# Patient Record
Sex: MALE | Race: WHITE | NOT HISPANIC OR LATINO | Employment: OTHER | URBAN - METROPOLITAN AREA
[De-identification: names, ages, dates, MRNs, and addresses within clinical notes are randomized per-mention and may not be internally consistent; named-entity substitution may affect disease eponyms.]

---

## 2023-01-31 DIAGNOSIS — E11.9 TYPE 2 DIABETES MELLITUS (H): ICD-10-CM

## 2023-01-31 DIAGNOSIS — Z76.0 ENCOUNTER FOR MEDICATION REFILL: Primary | ICD-10-CM

## 2023-01-31 RX ORDER — DULAGLUTIDE 0.75 MG/.5ML
0.75 INJECTION, SOLUTION SUBCUTANEOUS
Qty: 6 ML | Refills: 3 | OUTPATIENT
Start: 2023-01-31

## 2023-01-31 NOTE — TELEPHONE ENCOUNTER
There is no records currently available in the chart to indicate that patient has seen this provider.  Patient needs to establish cares.

## 2024-09-19 ENCOUNTER — APPOINTMENT (OUTPATIENT)
Dept: RADIOLOGY | Facility: HOSPITAL | Age: 69
End: 2024-09-19
Attending: EMERGENCY MEDICINE
Payer: MEDICARE

## 2024-09-19 ENCOUNTER — HOSPITAL ENCOUNTER (EMERGENCY)
Facility: HOSPITAL | Age: 69
Discharge: HOME | End: 2024-09-19
Attending: EMERGENCY MEDICINE | Admitting: EMERGENCY MEDICINE
Payer: MEDICARE

## 2024-09-19 VITALS
WEIGHT: 260 LBS | TEMPERATURE: 98.7 F | OXYGEN SATURATION: 97 % | RESPIRATION RATE: 18 BRPM | DIASTOLIC BLOOD PRESSURE: 91 MMHG | HEART RATE: 90 BPM | SYSTOLIC BLOOD PRESSURE: 139 MMHG

## 2024-09-19 DIAGNOSIS — R07.9 CHEST PAIN, UNSPECIFIED TYPE: ICD-10-CM

## 2024-09-19 LAB
ANION GAP SERPL CALCULATED.3IONS-SCNC: 11 MMOL/L (ref 7–15)
BASOPHILS # BLD AUTO: 0.1 10E3/UL (ref 0–0.2)
BASOPHILS NFR BLD AUTO: 1 %
BUN SERPL-MCNC: 10.1 MG/DL (ref 8–23)
CALCIUM SERPL-MCNC: 9.1 MG/DL (ref 8.8–10.4)
CHLORIDE SERPL-SCNC: 104 MMOL/L (ref 98–107)
CREAT SERPL-MCNC: 0.71 MG/DL (ref 0.67–1.17)
EGFRCR SERPLBLD CKD-EPI 2021: >90 ML/MIN/1.73M2
EOSINOPHIL # BLD AUTO: 0.3 10E3/UL (ref 0–0.7)
EOSINOPHIL NFR BLD AUTO: 3 %
ERYTHROCYTE [DISTWIDTH] IN BLOOD BY AUTOMATED COUNT: 12.6 % (ref 10–15)
GLUCOSE SERPL-MCNC: 204 MG/DL (ref 70–99)
HCO3 SERPL-SCNC: 23 MMOL/L (ref 22–29)
HCT VFR BLD AUTO: 44.5 % (ref 40–53)
HGB BLD-MCNC: 15.1 G/DL (ref 13.3–17.7)
HOLD SPECIMEN: NORMAL
IMM GRANULOCYTES # BLD: 0.1 10E3/UL
IMM GRANULOCYTES NFR BLD: 1 %
LYMPHOCYTES # BLD AUTO: 2.8 10E3/UL (ref 0.8–5.3)
LYMPHOCYTES NFR BLD AUTO: 30 %
MCH RBC QN AUTO: 29.5 PG (ref 26.5–33)
MCHC RBC AUTO-ENTMCNC: 33.9 G/DL (ref 31.5–36.5)
MCV RBC AUTO: 87 FL (ref 78–100)
MONOCYTES # BLD AUTO: 0.7 10E3/UL (ref 0–1.3)
MONOCYTES NFR BLD AUTO: 7 %
NEUTROPHILS # BLD AUTO: 5.3 10E3/UL (ref 1.6–8.3)
NEUTROPHILS NFR BLD AUTO: 58 %
NRBC # BLD AUTO: 0 10E3/UL
NRBC BLD AUTO-RTO: 0 /100
NT-PROBNP SERPL-MCNC: <36 PG/ML (ref 0–900)
PLATELET # BLD AUTO: 229 10E3/UL (ref 150–450)
POTASSIUM SERPL-SCNC: 4.2 MMOL/L (ref 3.4–5.3)
RBC # BLD AUTO: 5.12 10E6/UL (ref 4.4–5.9)
SODIUM SERPL-SCNC: 138 MMOL/L (ref 135–145)
TROPONIN T SERPL HS-MCNC: 13 NG/L
TROPONIN T SERPL HS-MCNC: 16 NG/L
WBC # BLD AUTO: 9.2 10E3/UL (ref 4–11)

## 2024-09-19 PROCEDURE — 85025 COMPLETE CBC W/AUTO DIFF WBC: CPT | Performed by: STUDENT IN AN ORGANIZED HEALTH CARE EDUCATION/TRAINING PROGRAM

## 2024-09-19 PROCEDURE — 83880 ASSAY OF NATRIURETIC PEPTIDE: CPT | Mod: GZ | Performed by: STUDENT IN AN ORGANIZED HEALTH CARE EDUCATION/TRAINING PROGRAM

## 2024-09-19 PROCEDURE — 80048 BASIC METABOLIC PNL TOTAL CA: CPT | Performed by: STUDENT IN AN ORGANIZED HEALTH CARE EDUCATION/TRAINING PROGRAM

## 2024-09-19 PROCEDURE — 36415 COLL VENOUS BLD VENIPUNCTURE: CPT | Performed by: STUDENT IN AN ORGANIZED HEALTH CARE EDUCATION/TRAINING PROGRAM

## 2024-09-19 PROCEDURE — 99285 EMERGENCY DEPT VISIT HI MDM: CPT | Mod: 25

## 2024-09-19 PROCEDURE — 93005 ELECTROCARDIOGRAM TRACING: CPT | Performed by: EMERGENCY MEDICINE

## 2024-09-19 PROCEDURE — 84484 ASSAY OF TROPONIN QUANT: CPT | Performed by: STUDENT IN AN ORGANIZED HEALTH CARE EDUCATION/TRAINING PROGRAM

## 2024-09-19 PROCEDURE — 71046 X-RAY EXAM CHEST 2 VIEWS: CPT

## 2024-09-19 ASSESSMENT — COLUMBIA-SUICIDE SEVERITY RATING SCALE - C-SSRS
6. HAVE YOU EVER DONE ANYTHING, STARTED TO DO ANYTHING, OR PREPARED TO DO ANYTHING TO END YOUR LIFE?: NO
1. IN THE PAST MONTH, HAVE YOU WISHED YOU WERE DEAD OR WISHED YOU COULD GO TO SLEEP AND NOT WAKE UP?: NO
2. HAVE YOU ACTUALLY HAD ANY THOUGHTS OF KILLING YOURSELF IN THE PAST MONTH?: NO

## 2024-09-19 NOTE — ED PROVIDER NOTES
EMERGENCY DEPARTMENT ENCOUNTER     NAME: Brad Acuna   AGE: 68 year old male   YOB: 1955   MRN: [id number]   EVALUATION DATE & TIME: No admission date for patient encounter.   PCP: No primary care provider on file.     Chief Complaint   Patient presents with    Chest Pain   :    FINAL IMPRESSION       1. Chest pain, unspecified type           ED COURSE & MEDICAL DECISION MAKING      Pertinent Labs & Imaging studies reviewed. (See chart for details)   68 year old male  presents to the Emergency Department for evaluation of intermittent chest discomfort over the last 3 weeks. Initial Vitals Reviewed. Initial exam notable for mildly well-appearing male who is asymptomatic at this time.  He was initially hypertensive to around 170 but improved to the 130s with no intervention after relaxing.  I did discuss that we would look to rule out acute ACS in the ED, but it is also possible that he does need repeat stress test or further cardiac testing as he could have coronary artery disease causing these symptoms.  I think it is very atypical for something like PE or dissection as symptoms have been intermittent over several weeks.  Fortunately, EKG is nonischemic and troponin on arrival and 2 hours later remains negative.  Patient feels comfortable with the discharge and follow-up plan and I placed a referral to the rapid access cardiology clinic and will discharge with return precautions.           At the conclusion of the encounter I discussed the results of all of the tests and the disposition. The questions were answered. The patient or family acknowledged understanding and was agreeable with the care plan.     0 minutes critical care time, see procedure note below for details if relevant    Medical Decision Making    History:  Supplemental history from: Documented in chart  External Record(s) reviewed: Outpatient Record: Phone call with internal medicine today    Work Up:  Chart documentation includes  differential considered and any EKGs or imaging independently interpreted by provider, where specified.  In additional to work up documented, I considered the following work up: Documented in chart, if applicable.    External consultation:  Discussion of management with another provider: Documented in chart, if applicable    Complicating factors:  Care impacted by chronic illness: Other: obesity, mental health  Care affected by social determinants of health: Access to Medical Care    Disposition considerations: Discharge. No recommendations on prescription strength medication(s). I considered admission, but ultimately discharged patient with reassuring workup and outpatient follow-up plan.    Not Applicable            MEDICATIONS GIVEN IN THE EMERGENCY:   Medications - No data to display   NEW PRESCRIPTIONS STARTED AT TODAY'S ER VISIT   New Prescriptions    No medications on file     ================================================================   HISTORY OF PRESENT ILLNESS       Patient information was obtained from: patient   Use of Intrepreter:   Brad Acuna is a 68 year old male with history of bipolar disorder who presents for evaluation of episodic chest discomfort.  Patient states this started initially about 3 weeks ago and has been coming intermittently since.  He describes it as a tightness in the front of his chest that when it is present is approximately a 1-3 in severity out of 10.  He has not previously sought care for this.  He does note that he had a stress test 1 time in the past but thinks it was about 20 years ago.  No shortness of breath.    ================================================================        PAST HISTORY     PAST MEDICAL HISTORY:   No past medical history on file.   PAST SURGICAL HISTORY:   No past surgical history on file.   CURRENT MEDICATIONS:   divalproex (DEPAKOTE) 500 MG 24 hr tablet  lithium (LITHOBID) 300 MG CR tablet  lurasidone (LATUDA) 80 MG TABS  tablet      ALLERGIES:   No Known Allergies   FAMILY HISTORY:   No family history on file.   SOCIAL HISTORY:   Social History     Socioeconomic History    Marital status: Single     Social Determinants of Health     Financial Resource Strain: Low Risk  (6/19/2024)    Received from [employer] [facility]    Financial Resource Strain     Difficulty of Paying Living Expenses: 3   Food Insecurity: No Food Insecurity (6/19/2024)    Received from [employer] [facility]    Food Insecurity     Worried About Running Out of Food in the Last Year: 1   Transportation Needs: No Transportation Needs (6/19/2024)    Received from [employer] [facility]    Transportation Needs     Lack of Transportation (Medical): 1   Social Connections: Socially Integrated (6/19/2024)    Received from [employer] [facility]    Social Connections     Frequency of Communication with Friends and Family: 0   Housing Stability: Low Risk  (6/19/2024)    Received from [employer] [facility]    Housing Stability     Unable to Pay for Housing in the Last Year: 1        VITALS  Patient Vitals for the past 24 hrs:   BP Temp Temp src Pulse Resp SpO2 Weight   09/19/24 1622 (!) 152/77 97.9  F (36.6  C) Oral 92 18 95 % 117.9 kg (260 lb)        ================================================================    PHYSICAL EXAM     VITAL SIGNS: BP (!) 152/77   Pulse 92   Temp 97.9  F (36.6  C) (Oral)   Resp 18   Wt 117.9 kg (260 lb)   SpO2 95%    Constitutional:  Awake, no acute distress   HENT:  Atraumatic, oropharynx without exudate or erythema, membranes moist  Lymph:  No adenopathy  Eyes: EOM intact, PERRL, no injection  Neck: Supple  Respiratory:  Clear to auscultation bilaterally, no wheezes or crackles   Cardiovascular:  Regular rate and rhythm, single S1 and S2   GI:  Soft, nontender, nondistended, no rebound or guarding   Musculoskeletal:  Moves  all extremities, no lower extremity edema, no deformities    Skin:  Warm, dry  Neurologic:  Alert and oriented x3, no focal deficits noted       ================================================================  LAB       All pertinent labs reviewed and interpreted.   Labs Ordered and Resulted from Time of ED Arrival to Time of ED Departure   BASIC METABOLIC PANEL - Abnormal       Result Value    Sodium 138      Potassium 4.2      Chloride 104      Carbon Dioxide (CO2) 23      Anion Gap 11      Urea Nitrogen 10.1      Creatinine 0.71      GFR Estimate >90      Calcium 9.1      Glucose 204 (*)    NT PROBNP INPATIENT - Normal    N terminal Pro BNP Inpatient <36     TROPONIN T, HIGH SENSITIVITY - Normal    Troponin T, High Sensitivity 16     TROPONIN T, HIGH SENSITIVITY - Normal    Troponin T, High Sensitivity 13     CBC WITH PLATELETS AND DIFFERENTIAL    WBC Count 9.2      RBC Count 5.12      Hemoglobin 15.1      Hematocrit 44.5      MCV 87      MCH 29.5      MCHC 33.9      RDW 12.6      Platelet Count 229      % Neutrophils 58      % Lymphocytes 30      % Monocytes 7      % Eosinophils 3      % Basophils 1      % Immature Granulocytes 1      NRBCs per 100 WBC 0      Absolute Neutrophils 5.3      Absolute Lymphocytes 2.8      Absolute Monocytes 0.7      Absolute Eosinophils 0.3      Absolute Basophils 0.1      Absolute Immature Granulocytes 0.1      Absolute NRBCs 0.0          ===============================================================  RADIOLOGY       Reviewed all pertinent imaging. Please see official radiology report.   XR Chest 2 Views   Final Result   IMPRESSION: Minimal left basilar scarring. The lungs are otherwise clear. No pleural effusion. Normal heart size.            ================================================================  EKG     EKG reviewed interpreted by me shows sinus rhythm with a rate of 93, left axis, QTc 425 with no acute ST or T wave changes no previous to compare    I have  independently reviewed and interpreted the EKG(s) documented above.     ================================================================  PROCEDURES           Purnima Virgen M.D.   Emergency Medicine   [facility] EMERGENCY DEPARTMENT  [address] [place] [id number]  [phone]  Dept: [phone]        Purnima Virgen MD  09/19/24 2015

## 2024-09-19 NOTE — ED TRIAGE NOTES
The pt presents for evaluation of intermittent chest pain across his chest. Pain has been ongoing for about months. EKG done before triage.

## 2024-09-20 NOTE — DISCHARGE INSTRUCTIONS
Fortunately, the tests in the ER are negative for an acute heart attack.  Like we discussed, the neck step is a referral has been placed to the rapid access cardiology clinic.  They should be calling you tomorrow to get this scheduled and have additional testing of your heart.

## 2024-09-30 LAB
ABO/RH(D): NORMAL
ANTIBODY SCREEN: NEGATIVE
ATRIAL RATE - MUSE: 93 BPM
DIASTOLIC BLOOD PRESSURE - MUSE: NORMAL MMHG
INTERPRETATION ECG - MUSE: NORMAL
P AXIS - MUSE: 49 DEGREES
PR INTERVAL - MUSE: 206 MS
QRS DURATION - MUSE: 100 MS
QT - MUSE: 342 MS
QTC - MUSE: 425 MS
R AXIS - MUSE: -37 DEGREES
SPECIMEN EXPIRATION DATE: NORMAL
SYSTOLIC BLOOD PRESSURE - MUSE: NORMAL MMHG
T AXIS - MUSE: 37 DEGREES
VENTRICULAR RATE- MUSE: 93 BPM

## 2024-10-01 ENCOUNTER — OFFICE VISIT (OUTPATIENT)
Dept: CARDIOLOGY | Facility: CLINIC | Age: 69
End: 2024-10-01
Payer: MEDICARE

## 2024-10-01 ENCOUNTER — PREP FOR PROCEDURE (OUTPATIENT)
Dept: CARDIOLOGY | Facility: CLINIC | Age: 69
End: 2024-10-01

## 2024-10-01 ENCOUNTER — DOCUMENTATION ONLY (OUTPATIENT)
Dept: CARDIOLOGY | Facility: CLINIC | Age: 69
End: 2024-10-01

## 2024-10-01 VITALS
HEART RATE: 72 BPM | DIASTOLIC BLOOD PRESSURE: 84 MMHG | WEIGHT: 266 LBS | RESPIRATION RATE: 14 BRPM | SYSTOLIC BLOOD PRESSURE: 144 MMHG

## 2024-10-01 DIAGNOSIS — I20.89 STABLE ANGINA PECTORIS (H): ICD-10-CM

## 2024-10-01 DIAGNOSIS — I20.9 TYPICAL ANGINA (H): Primary | ICD-10-CM

## 2024-10-01 DIAGNOSIS — Z01.812 PRE-PROCEDURE LAB EXAM: Primary | ICD-10-CM

## 2024-10-01 DIAGNOSIS — Z01.812 PRE-PROCEDURE LAB EXAM: ICD-10-CM

## 2024-10-01 LAB
ANION GAP SERPL CALCULATED.3IONS-SCNC: 11 MMOL/L (ref 7–15)
BUN SERPL-MCNC: 8.9 MG/DL (ref 8–23)
CALCIUM SERPL-MCNC: 9.3 MG/DL (ref 8.8–10.4)
CHLORIDE SERPL-SCNC: 102 MMOL/L (ref 98–107)
CHOLEST SERPL-MCNC: 173 MG/DL
CREAT SERPL-MCNC: 0.76 MG/DL (ref 0.67–1.17)
EGFRCR SERPLBLD CKD-EPI 2021: >90 ML/MIN/1.73M2
ERYTHROCYTE [DISTWIDTH] IN BLOOD BY AUTOMATED COUNT: 12.6 % (ref 10–15)
FASTING STATUS PATIENT QL REPORTED: ABNORMAL
GLUCOSE SERPL-MCNC: 226 MG/DL (ref 70–99)
HCO3 SERPL-SCNC: 25 MMOL/L (ref 22–29)
HCT VFR BLD AUTO: 47.3 % (ref 40–53)
HDLC SERPL-MCNC: 30 MG/DL
HGB BLD-MCNC: 16 G/DL (ref 13.3–17.7)
LDLC SERPL CALC-MCNC: 102 MG/DL
MCH RBC QN AUTO: 29.7 PG (ref 26.5–33)
MCHC RBC AUTO-ENTMCNC: 33.8 G/DL (ref 31.5–36.5)
MCV RBC AUTO: 88 FL (ref 78–100)
NONHDLC SERPL-MCNC: 143 MG/DL
PLATELET # BLD AUTO: 199 10E3/UL (ref 150–450)
POTASSIUM SERPL-SCNC: 4.9 MMOL/L (ref 3.4–5.3)
RBC # BLD AUTO: 5.38 10E6/UL (ref 4.4–5.9)
SODIUM SERPL-SCNC: 138 MMOL/L (ref 135–145)
TRIGL SERPL-MCNC: 204 MG/DL
WBC # BLD AUTO: 7.9 10E3/UL (ref 4–11)

## 2024-10-01 PROCEDURE — 80048 BASIC METABOLIC PNL TOTAL CA: CPT | Performed by: STUDENT IN AN ORGANIZED HEALTH CARE EDUCATION/TRAINING PROGRAM

## 2024-10-01 PROCEDURE — G2211 COMPLEX E/M VISIT ADD ON: HCPCS | Performed by: STUDENT IN AN ORGANIZED HEALTH CARE EDUCATION/TRAINING PROGRAM

## 2024-10-01 PROCEDURE — 85027 COMPLETE CBC AUTOMATED: CPT | Performed by: STUDENT IN AN ORGANIZED HEALTH CARE EDUCATION/TRAINING PROGRAM

## 2024-10-01 PROCEDURE — 86850 RBC ANTIBODY SCREEN: CPT | Performed by: STUDENT IN AN ORGANIZED HEALTH CARE EDUCATION/TRAINING PROGRAM

## 2024-10-01 PROCEDURE — 80061 LIPID PANEL: CPT | Performed by: STUDENT IN AN ORGANIZED HEALTH CARE EDUCATION/TRAINING PROGRAM

## 2024-10-01 PROCEDURE — 36415 COLL VENOUS BLD VENIPUNCTURE: CPT | Performed by: STUDENT IN AN ORGANIZED HEALTH CARE EDUCATION/TRAINING PROGRAM

## 2024-10-01 PROCEDURE — 99205 OFFICE O/P NEW HI 60 MIN: CPT | Performed by: STUDENT IN AN ORGANIZED HEALTH CARE EDUCATION/TRAINING PROGRAM

## 2024-10-01 PROCEDURE — 86900 BLOOD TYPING SEROLOGIC ABO: CPT | Performed by: STUDENT IN AN ORGANIZED HEALTH CARE EDUCATION/TRAINING PROGRAM

## 2024-10-01 PROCEDURE — 86901 BLOOD TYPING SEROLOGIC RH(D): CPT | Performed by: STUDENT IN AN ORGANIZED HEALTH CARE EDUCATION/TRAINING PROGRAM

## 2024-10-01 RX ORDER — SODIUM CHLORIDE 9 MG/ML
INJECTION, SOLUTION INTRAVENOUS CONTINUOUS
Status: CANCELLED | OUTPATIENT
Start: 2024-10-02

## 2024-10-01 RX ORDER — QUETIAPINE FUMARATE 100 MG/1
100 TABLET, FILM COATED ORAL AT BEDTIME
COMMUNITY
Start: 2024-05-22

## 2024-10-01 RX ORDER — ASPIRIN 81 MG/1
81 TABLET ORAL DAILY
Qty: 90 TABLET | Refills: 3 | Status: SHIPPED | OUTPATIENT
Start: 2024-10-01

## 2024-10-01 RX ORDER — BLOOD SUGAR DIAGNOSTIC
STRIP MISCELLANEOUS
COMMUNITY
Start: 2024-08-11

## 2024-10-01 RX ORDER — INSULIN GLARGINE 100 [IU]/ML
INJECTION, SOLUTION SUBCUTANEOUS
COMMUNITY
Start: 2024-09-12

## 2024-10-01 RX ORDER — ASPIRIN 325 MG
325 TABLET ORAL ONCE
Status: CANCELLED | OUTPATIENT
Start: 2024-10-02 | End: 2024-10-01

## 2024-10-01 RX ORDER — DULAGLUTIDE 3 MG/.5ML
INJECTION, SOLUTION SUBCUTANEOUS
COMMUNITY

## 2024-10-01 RX ORDER — BLOOD-GLUCOSE METER
EACH MISCELLANEOUS
COMMUNITY
Start: 2024-05-10

## 2024-10-01 RX ORDER — NICOTINE POLACRILEX 4 MG
15-30 LOZENGE BUCCAL
Status: CANCELLED | OUTPATIENT
Start: 2024-10-02

## 2024-10-01 RX ORDER — AMLODIPINE BESYLATE 5 MG/1
5 TABLET ORAL DAILY
Qty: 90 TABLET | Refills: 3 | Status: SHIPPED | OUTPATIENT
Start: 2024-10-01

## 2024-10-01 RX ORDER — DEXTROSE MONOHYDRATE 25 G/50ML
25-50 INJECTION, SOLUTION INTRAVENOUS
Status: CANCELLED | OUTPATIENT
Start: 2024-10-02

## 2024-10-01 RX ORDER — METFORMIN HCL 500 MG
1000 TABLET, EXTENDED RELEASE 24 HR ORAL 2 TIMES DAILY WITH MEALS
COMMUNITY
Start: 2024-09-17

## 2024-10-01 RX ORDER — ASPIRIN 81 MG/1
243 TABLET, CHEWABLE ORAL ONCE
Status: CANCELLED | OUTPATIENT
Start: 2024-10-02

## 2024-10-01 RX ORDER — ATORVASTATIN CALCIUM 40 MG/1
60 TABLET, FILM COATED ORAL
COMMUNITY
Start: 2024-04-09 | End: 2024-10-01

## 2024-10-01 RX ORDER — FENTANYL CITRATE 50 UG/ML
25 INJECTION, SOLUTION INTRAMUSCULAR; INTRAVENOUS
Status: CANCELLED | OUTPATIENT
Start: 2024-10-02

## 2024-10-01 RX ORDER — QUETIAPINE FUMARATE 50 MG/1
TABLET, FILM COATED ORAL
COMMUNITY
Start: 2024-09-18

## 2024-10-01 RX ORDER — ATORVASTATIN CALCIUM 80 MG/1
80 TABLET, FILM COATED ORAL DAILY
Qty: 90 TABLET | Refills: 3 | Status: SHIPPED | OUTPATIENT
Start: 2024-10-01

## 2024-10-01 RX ORDER — OLANZAPINE 15 MG/1
15 TABLET ORAL AT BEDTIME
COMMUNITY
Start: 2024-07-23

## 2024-10-01 RX ORDER — LIDOCAINE 40 MG/G
CREAM TOPICAL
Status: CANCELLED | OUTPATIENT
Start: 2024-10-01

## 2024-10-01 NOTE — LETTER
10/1/2024    [facility]  [address]  [facility] [address]    RE: Brad Acuna       Dear Colleague,     I had the pleasure of seeing Brad Acuna in the [facility].  Cardiology Clinic    Brad Acuna is a 68 year old with a history of type 2 diabetes and bipolar disorder who presents for evaluation of chest pain.    Was seen in the emergency department on 9/19 for chest pain that has been intermittent over the past few weeks.  Troponin was unremarkable and EKG was unremarkable which prompted discharge home.    Real states that he has had 2 major episodes of chest pain.  One a few weeks ago that was associated with shortness of breath, no nausea, or diaphoresis and came on at rest.  Lasted for about 30 to 40 minutes.  This was significantly more severe than his later presentation which prompted his going to the emergency department.  This episode was more mild but lasted for similar time.    Notably, he has had diabetes for about 10 years and it has not been well-controlled at all.  He has been told he has had high blood pressure for the last few months but has not been on medication.    General: Well appearing, appears stated age.  CV: Normal rate and rhythm. No m/r/g. No JVD.   Pulm: Normal effort. Normal breath sounds.  Lower extremities: No lower extremity edema.    Labs:   Reviewed in epic.  A1c 9.5%  Hemoglobin 15.1  Previous 329  LDL 92    Testing:  EKG 9/19/2024: Normal sinus rhythm, left axis deviation, inferior infarct, poor R wave progression in the anterior leads    Assessment and Plan:    1.  Typical angina.  I suspect that he has significant coronary artery disease given his risk factors including uncontrolled diabetes and hypertension.  His EKG also has evidence of previous ischemia.  Will pursue a left heart catheterization and a TTE same day.    2.  Hypertension, uncontrolled.  Will start amlodipine 5 mg daily, I suspect he will need 1-2 full-dose  agents.    3.  Hyperlipidemia, elevated ASCVD risk.  Will increase atorvastatin to 80 mg daily with a goal LDL of less than 55 mg/dL.    The longitudinal plan of care for the diagnosis(es)/condition(s) as documented were addressed during this visit. Due to the added complexity in care, I will continue to support Brad in the subsequent management and with ongoing continuity of care.    Return in about 3 months (around 1/1/2025).    Behzad Pichardo MD  Interventional Cardiology      Thank you for allowing me to participate in the care of your patient.      Sincerely,     Behzad Pichardo MD     [facility]  cc:   Purnima Virgen MD  [address] [place] [address]

## 2024-10-01 NOTE — PROGRESS NOTES
From: Candi Santamaria  Sent: 10/1/2024  10:31 AM CDT  To: Sissy Mohr  Subject: KG ORDERING                                      Case type: CORS POSS PCI, C    Procedure Physician(s): KG ONLY     Procedure Date and Patient Arrival Time: Wednesday 10/2, with arrival time of 0730    H&P: Completed on 10/1 KG     Labs: DONE TODAY    Alerts/Important Info: Diabetic, echo done in [facility] same day    Thanks!   Tami Acuna  [address] [place] [address]  [phone] (home)     PCP:  Tracee Gomez  H&P completed by:  Dr. Pichardo  Admit date 10/02 Arrival time:  0730  Anticoagulation:  NA  Previous PCI: No  Bypass Grafts: No  Renal Issues: No  Diabetic?: Yes  Device?: No  Type:    Ambulation status: independent    Reason for Visit:  Patient seen for pre-procedure education in preparation for: Left Heart Catheterization and Coronary Angiogram with Possible PCI    Procedure Prep:  EKG results obtained, dated: To be completed on day of cath lab procedure  Hemogram results obtained: Completed on 10/01  Basic Metabolic Panel results obtained: Completed on 10/01  Pertinent cardiac test results:     Patient Education    Your arrival time is 0730.  [place] [address] - Main Entrance of the Hospital  Please plan on being at the hospital all day.  At any time, emergencies and/or urgent cases may come up which could delay the start of your procedure.    COVID Testing Instructions  *Mandatory COVID Testing: ALL Patients will need to complete a COVID test no sooner than 4 days prior to their procedure (regardless of vaccination status).    To schedule COVID testing Please call [phone]  If you want to complete this at an outside facility please call them to find out if they will have the results within the appropriate time frame and their fax number.  You will need to provide us with that information so we can send the order.  The  facility completing the test will need to fax the results to [phone]  If you are running into and issues please call us.     Pre-procedure instructions  Take your temperature in the morning prior to coming in.  If your temperature is 100 F please call [facility] [phone] and notify them.  If you do not have access to a thermometer at home, please come in for testing.  If you are running a temperature your procedure may be rescheduled.  Patient instructed to not Eat or Drink after midnight.  Patient instructed to shower the evening before or the morning of the procedure.  Patient instructed to arrange for transportation home following procedure from a responsible family member or friend. No driving for at least 24 hours.  Patient instructed to have a responsible adult with them for 24 hours post-procedure.  Post-procedure follow up process.  Conscious sedation discussed.      Pre-procedure medication instructions.  Continue medications as scheduled, with a small amount of water on the day of the procedure unless indicated. (NO Diabetic Medications or Blood Thinners)  Pt instructed not to consume Alcohol, Tobacco, Caffeine, or Carbonated beverages 12 hours prior to procedure.  Patient instructed to take 324 mg of Aspirin the morning of procedure: Yes                Diabetic Medication Instructions  DO NOT take any oral diabetic medication, short-acting diabetes medications/insulin, humalog or regular insulin the morning of your test  Take   dose of long-acting insulin (Lantus, Levemir) the day of your test  Hold Oral Diabetic on the day of the procedure and for 48 hours after IV contrast given  Remember to  bring your glucometer and insulin with you to take after your test if needed     Pt takes Trulicity on Thursdays and will hold Metformin    Patient states understanding of procedure and agrees to proceed.    *PATIENTS RECORDS AVAILABLE IN [facility] UNLESS OTHERWISE INDICATED*      Patient Active Problem List    Diagnosis   (none) - all problems resolved or deleted       Current Outpatient Medications   Medication Sig Dispense Refill    ACCU-CHEK GUIDE test strip TEST ONCE DAILY AS DIRECTED      amLODIPine (NORVASC) 5 MG tablet Take 1 tablet (5 mg) by mouth daily. 90 tablet 3    aspirin 81 MG EC tablet Take 1 tablet (81 mg) by mouth daily. 90 tablet 3    atorvastatin (LIPITOR) 80 MG tablet Take 1 tablet (80 mg) by mouth daily. 90 tablet 3    Blood Glucose Monitoring Suppl (ACCU-CHEK GUIDE ME) w/Device KIT PLEASE SEE ATTACHED FOR DETAILED DIRECTIONS      insulin glargine (LANTUS PEN) 100 UNIT/ML pen Subcutaneous injection: 35 Units before bedtime and 35 Units in am. Product Desired: Basaglar      INSULIN GLARGINE 100 UNIT/ML pen INJECT 35 UNITS BEFORE BEDTIME AND 35 UNITS IN THE AM      metFORMIN (GLUCOPHAGE XR) 500 MG 24 hr tablet Take 1,000 mg by mouth.      OLANZapine (ZYPREXA) 15 MG tablet Take 15 mg by mouth at bedtime.      QUEtiapine (SEROQUEL) 100 MG tablet Take 100 mg by mouth at bedtime.      QUEtiapine (SEROQUEL) 50 MG tablet **PT MUST SCHEDULE APPT FOR ANY FURTHER REFILLS**      TRULICITY 3 MG/0.5ML SOPN INJECT 3 MG SUBCUTANEOUSLY ONCE WEEKLY         Allergies   Allergen Reactions    Bupropion Unknown       Sissy Mohr on 10/1/2024 at 10:54 AM

## 2024-10-01 NOTE — H&P (VIEW-ONLY)
Cardiology Clinic    Brad Acuna is a 68 year old with a history of type 2 diabetes and bipolar disorder who presents for evaluation of chest pain.    Was seen in the emergency department on 9/19 for chest pain that has been intermittent over the past few weeks.  Troponin was unremarkable and EKG was unremarkable which prompted discharge home.    Real states that he has had 2 major episodes of chest pain.  One a few weeks ago that was associated with shortness of breath, no nausea, or diaphoresis and came on at rest.  Lasted for about 30 to 40 minutes.  This was significantly more severe than his later presentation which prompted his going to the emergency department.  This episode was more mild but lasted for similar time.    Notably, he has had diabetes for about 10 years and it has not been well-controlled at all.  He has been told he has had high blood pressure for the last few months but has not been on medication.    General: Well appearing, appears stated age.  CV: Normal rate and rhythm. No m/r/g. No JVD.   Pulm: Normal effort. Normal breath sounds.  Lower extremities: No lower extremity edema.    Labs:   Reviewed in epic.  A1c 9.5%  Hemoglobin 15.1  Previous 329  LDL 92    Testing:  EKG 9/19/2024: Normal sinus rhythm, left axis deviation, inferior infarct, poor R wave progression in the anterior leads    Assessment and Plan:    1.  Typical angina.  I suspect that he has significant coronary artery disease given his risk factors including uncontrolled diabetes and hypertension.  His EKG also has evidence of previous ischemia.  Will pursue a left heart catheterization and a TTE same day.    2.  Hypertension, uncontrolled.  Will start amlodipine 5 mg daily, I suspect he will need 1-2 full-dose agents.    3.  Hyperlipidemia, elevated ASCVD risk.  Will increase atorvastatin to 80 mg daily with a goal LDL of less than 55 mg/dL.    The longitudinal plan of care for the diagnosis(es)/condition(s) as documented  were addressed during this visit. Due to the added complexity in care, I will continue to support Brad in the subsequent management and with ongoing continuity of care.    Return in about 3 months (around 1/1/2025).    Behzad Pichardo MD  Interventional Cardiology

## 2024-10-01 NOTE — PATIENT INSTRUCTIONS
Goal blood pressure <130/80 mmHg. Start amlodipine 5 mg daily today.    Goal cholesterol <55 mg/dL, yours was 86. Will increase your atorvastatin to 80 mg daily.    Will schedule a heart catheterization and an ultrasound of the heart.      
Women, Infants, and Children Program (WIC)/Supplemental Nutrition Assistance Program (SNAP)

## 2024-10-02 ENCOUNTER — HOSPITAL ENCOUNTER (OUTPATIENT)
Facility: HOSPITAL | Age: 69
Discharge: HOME | End: 2024-10-02
Attending: STUDENT IN AN ORGANIZED HEALTH CARE EDUCATION/TRAINING PROGRAM | Admitting: STUDENT IN AN ORGANIZED HEALTH CARE EDUCATION/TRAINING PROGRAM
Payer: MEDICARE

## 2024-10-02 ENCOUNTER — HOSPITAL ENCOUNTER (OUTPATIENT)
Dept: CARDIOLOGY | Facility: HOSPITAL | Age: 69
Discharge: HOME | End: 2024-10-02
Attending: STUDENT IN AN ORGANIZED HEALTH CARE EDUCATION/TRAINING PROGRAM | Admitting: STUDENT IN AN ORGANIZED HEALTH CARE EDUCATION/TRAINING PROGRAM
Payer: MEDICARE

## 2024-10-02 VITALS
TEMPERATURE: 97.9 F | HEIGHT: 74 IN | HEART RATE: 79 BPM | BODY MASS INDEX: 34.14 KG/M2 | SYSTOLIC BLOOD PRESSURE: 134 MMHG | WEIGHT: 266 LBS | OXYGEN SATURATION: 94 % | RESPIRATION RATE: 22 BRPM | DIASTOLIC BLOOD PRESSURE: 79 MMHG

## 2024-10-02 DIAGNOSIS — I20.9 TYPICAL ANGINA (H): ICD-10-CM

## 2024-10-02 DIAGNOSIS — I20.89 STABLE ANGINA PECTORIS (H): ICD-10-CM

## 2024-10-02 PROBLEM — Z98.890 STATUS POST CORONARY ANGIOGRAM: Status: ACTIVE | Noted: 2024-10-02

## 2024-10-02 LAB
ATRIAL RATE - MUSE: 81 BPM
DIASTOLIC BLOOD PRESSURE - MUSE: NORMAL MMHG
GLUCOSE BLDC GLUCOMTR-MCNC: 202 MG/DL (ref 70–99)
GLUCOSE BLDC GLUCOMTR-MCNC: 218 MG/DL (ref 70–99)
INTERPRETATION ECG - MUSE: NORMAL
P AXIS - MUSE: 16 DEGREES
PR INTERVAL - MUSE: 192 MS
QRS DURATION - MUSE: 100 MS
QT - MUSE: 374 MS
QTC - MUSE: 434 MS
R AXIS - MUSE: -3 DEGREES
SYSTOLIC BLOOD PRESSURE - MUSE: NORMAL MMHG
T AXIS - MUSE: 26 DEGREES
VENTRICULAR RATE- MUSE: 81 BPM

## 2024-10-02 PROCEDURE — 93458 L HRT ARTERY/VENTRICLE ANGIO: CPT | Performed by: STUDENT IN AN ORGANIZED HEALTH CARE EDUCATION/TRAINING PROGRAM

## 2024-10-02 PROCEDURE — 93010 ELECTROCARDIOGRAM REPORT: CPT | Mod: HOP | Performed by: INTERNAL MEDICINE

## 2024-10-02 PROCEDURE — 258N000003 HC RX IP 258 OP 636: Performed by: STUDENT IN AN ORGANIZED HEALTH CARE EDUCATION/TRAINING PROGRAM

## 2024-10-02 PROCEDURE — 250N000011 HC RX IP 250 OP 636: Performed by: STUDENT IN AN ORGANIZED HEALTH CARE EDUCATION/TRAINING PROGRAM

## 2024-10-02 PROCEDURE — 250N000013 HC RX MED GY IP 250 OP 250 PS 637: Performed by: NURSE PRACTITIONER

## 2024-10-02 PROCEDURE — 93458 L HRT ARTERY/VENTRICLE ANGIO: CPT | Mod: 26 | Performed by: STUDENT IN AN ORGANIZED HEALTH CARE EDUCATION/TRAINING PROGRAM

## 2024-10-02 PROCEDURE — 272N000001 HC OR GENERAL SUPPLY STERILE: Performed by: STUDENT IN AN ORGANIZED HEALTH CARE EDUCATION/TRAINING PROGRAM

## 2024-10-02 PROCEDURE — 93306 TTE W/DOPPLER COMPLETE: CPT

## 2024-10-02 PROCEDURE — 250N000009 HC RX 250: Performed by: STUDENT IN AN ORGANIZED HEALTH CARE EDUCATION/TRAINING PROGRAM

## 2024-10-02 PROCEDURE — 255N000002 HC RX 255 OP 636: Performed by: STUDENT IN AN ORGANIZED HEALTH CARE EDUCATION/TRAINING PROGRAM

## 2024-10-02 PROCEDURE — 93005 ELECTROCARDIOGRAM TRACING: CPT

## 2024-10-02 PROCEDURE — 93306 TTE W/DOPPLER COMPLETE: CPT | Mod: 26 | Performed by: INTERNAL MEDICINE

## 2024-10-02 PROCEDURE — 999N000099 HC STATISTIC MODERATE SEDATION < 10 MIN: Performed by: STUDENT IN AN ORGANIZED HEALTH CARE EDUCATION/TRAINING PROGRAM

## 2024-10-02 PROCEDURE — 82962 GLUCOSE BLOOD TEST: CPT

## 2024-10-02 PROCEDURE — C1887 CATHETER, GUIDING: HCPCS | Performed by: STUDENT IN AN ORGANIZED HEALTH CARE EDUCATION/TRAINING PROGRAM

## 2024-10-02 PROCEDURE — C1894 INTRO/SHEATH, NON-LASER: HCPCS | Performed by: STUDENT IN AN ORGANIZED HEALTH CARE EDUCATION/TRAINING PROGRAM

## 2024-10-02 PROCEDURE — 999N000054 HC STATISTIC EKG NON-CHARGEABLE

## 2024-10-02 RX ORDER — ASPIRIN 81 MG/1
243 TABLET, CHEWABLE ORAL ONCE
Status: COMPLETED | OUTPATIENT
Start: 2024-10-02 | End: 2024-10-02

## 2024-10-02 RX ORDER — LIDOCAINE 40 MG/G
CREAM TOPICAL
Status: DISCONTINUED | OUTPATIENT
Start: 2024-10-02 | End: 2024-10-02 | Stop reason: HOSPADM

## 2024-10-02 RX ORDER — FENTANYL CITRATE 50 UG/ML
INJECTION, SOLUTION INTRAMUSCULAR; INTRAVENOUS
Status: DISCONTINUED | OUTPATIENT
Start: 2024-10-02 | End: 2024-10-02 | Stop reason: HOSPADM

## 2024-10-02 RX ORDER — DEXTROSE MONOHYDRATE 25 G/50ML
25-50 INJECTION, SOLUTION INTRAVENOUS
Status: DISCONTINUED | OUTPATIENT
Start: 2024-10-02 | End: 2024-10-02 | Stop reason: HOSPADM

## 2024-10-02 RX ORDER — HEPARIN SODIUM 1000 [USP'U]/ML
INJECTION, SOLUTION INTRAVENOUS; SUBCUTANEOUS
Status: DISCONTINUED | OUTPATIENT
Start: 2024-10-02 | End: 2024-10-02 | Stop reason: HOSPADM

## 2024-10-02 RX ORDER — FLUMAZENIL 0.1 MG/ML
0.2 INJECTION, SOLUTION INTRAVENOUS
Status: DISCONTINUED | OUTPATIENT
Start: 2024-10-02 | End: 2024-10-02 | Stop reason: HOSPADM

## 2024-10-02 RX ORDER — FENTANYL CITRATE 50 UG/ML
25 INJECTION, SOLUTION INTRAMUSCULAR; INTRAVENOUS
Status: DISCONTINUED | OUTPATIENT
Start: 2024-10-02 | End: 2024-10-02 | Stop reason: HOSPADM

## 2024-10-02 RX ORDER — IODIXANOL 320 MG/ML
INJECTION, SOLUTION INTRAVASCULAR
Status: DISCONTINUED | OUTPATIENT
Start: 2024-10-02 | End: 2024-10-02 | Stop reason: HOSPADM

## 2024-10-02 RX ORDER — ATROPINE SULFATE 0.1 MG/ML
0.5 INJECTION INTRAVENOUS
Status: DISCONTINUED | OUTPATIENT
Start: 2024-10-02 | End: 2024-10-02 | Stop reason: HOSPADM

## 2024-10-02 RX ORDER — ACETAMINOPHEN 325 MG/1
650 TABLET ORAL EVERY 4 HOURS PRN
Status: DISCONTINUED | OUTPATIENT
Start: 2024-10-02 | End: 2024-10-02 | Stop reason: HOSPADM

## 2024-10-02 RX ORDER — HEPARIN SODIUM 200 [USP'U]/100ML
INJECTION, SOLUTION INTRAVENOUS
Status: DISCONTINUED | OUTPATIENT
Start: 2024-10-02 | End: 2024-10-02 | Stop reason: HOSPADM

## 2024-10-02 RX ORDER — OXYCODONE HYDROCHLORIDE 5 MG/1
10 TABLET ORAL EVERY 4 HOURS PRN
Status: DISCONTINUED | OUTPATIENT
Start: 2024-10-02 | End: 2024-10-02 | Stop reason: HOSPADM

## 2024-10-02 RX ORDER — ASPIRIN 325 MG
325 TABLET ORAL ONCE
Status: COMPLETED | OUTPATIENT
Start: 2024-10-02 | End: 2024-10-02

## 2024-10-02 RX ORDER — SODIUM CHLORIDE 9 MG/ML
INJECTION, SOLUTION INTRAVENOUS CONTINUOUS
Status: DISCONTINUED | OUTPATIENT
Start: 2024-10-02 | End: 2024-10-02 | Stop reason: HOSPADM

## 2024-10-02 RX ORDER — NALOXONE HYDROCHLORIDE 0.4 MG/ML
0.4 INJECTION, SOLUTION INTRAMUSCULAR; INTRAVENOUS; SUBCUTANEOUS
Status: DISCONTINUED | OUTPATIENT
Start: 2024-10-02 | End: 2024-10-02 | Stop reason: HOSPADM

## 2024-10-02 RX ORDER — NALOXONE HYDROCHLORIDE 0.4 MG/ML
0.2 INJECTION, SOLUTION INTRAMUSCULAR; INTRAVENOUS; SUBCUTANEOUS
Status: DISCONTINUED | OUTPATIENT
Start: 2024-10-02 | End: 2024-10-02 | Stop reason: HOSPADM

## 2024-10-02 RX ORDER — DIAZEPAM 5 MG/1
5 TABLET ORAL ONCE
Status: COMPLETED | OUTPATIENT
Start: 2024-10-02 | End: 2024-10-02

## 2024-10-02 RX ORDER — NICOTINE POLACRILEX 4 MG
15-30 LOZENGE BUCCAL
Status: DISCONTINUED | OUTPATIENT
Start: 2024-10-02 | End: 2024-10-02 | Stop reason: HOSPADM

## 2024-10-02 RX ORDER — OXYCODONE HYDROCHLORIDE 5 MG/1
5 TABLET ORAL EVERY 4 HOURS PRN
Status: DISCONTINUED | OUTPATIENT
Start: 2024-10-02 | End: 2024-10-02 | Stop reason: HOSPADM

## 2024-10-02 RX ADMIN — SODIUM CHLORIDE: 9 INJECTION, SOLUTION INTRAVENOUS at 08:14

## 2024-10-02 RX ADMIN — DIAZEPAM 5 MG: 5 TABLET ORAL at 08:14

## 2024-10-02 ASSESSMENT — ACTIVITIES OF DAILY LIVING (ADL)
ADLS_ACUITY_SCORE: 35

## 2024-10-02 NOTE — INTERVAL H&P NOTE
"I have reviewed the surgical (or preoperative) H&P that is linked to this encounter, and examined the patient. There are no significant changes    Clinical Conditions Present on Arrival:  Clinically Significant Risk Factors Present on Admission                 # Drug Induced Platelet Defect: home medication list includes an antiplatelet medication      # Obesity: Estimated body mass index is 34.15 kg/m  as calculated from the following:    Height as of this encounter: 1.88 m (6' 2\").    Weight as of this encounter: 120.7 kg (266 lb).       "

## 2024-10-02 NOTE — PROGRESS NOTES
Discharged to home following angiogram. Post radial care instructions reviewed and he states a good understanding. Follow up scheduled in clinic.

## 2024-10-02 NOTE — DISCHARGE INSTRUCTIONS

## 2024-10-02 NOTE — PRE-PROCEDURE
GENERAL PRE-PROCEDURE:   Procedure:  Coronary angiogram with possible PCI, left heart catheterization  Date/Time:  10/2/2024 7:51 AM    Written consent obtained?: Yes    Risks and benefits: Risks, benefits and alternatives were discussed    Consent given by:  Patient  Patient states understanding of procedure being performed: Yes    Patient's understanding of procedure matches consent: Yes    Procedure consent matches procedure scheduled: Yes    Expected level of sedation:  Moderate  Appropriately NPO:  Yes  ASA Class:  3 (chest pain, HTN, HLD, DM Type II, bipolar d/o)  Mallampati  :  Grade 2- soft palate, base of uvula, tonsillar pillars, and portion of posterior pharyngeal wall visible  Lungs:  Lungs clear with good breath sounds bilaterally  Heart:  Normal heart sounds and rate  History & Physical reviewed:  History and physical reviewed and updates made (see comment)  H&P Comments:  Clinically Significant Risk Factors Present on Admission    Cardiovascular : chest pain, HTN, HLD, DM Type II    Fluid & Electrolyte Disorders : Not present on admission    Gastroenterology : Not present on admission    Hematology/Oncology : Not present on admission    Nephrology : Not present on admission    Neurology : Bipolar d/o    Pulmonology : Not present on admission    Systemic : Not present on admission    Statement of review:  I have reviewed the lab findings, diagnostic data, medications, and the plan for sedation

## 2025-02-20 ENCOUNTER — OFFICE VISIT (OUTPATIENT)
Dept: CARDIOLOGY | Facility: CLINIC | Age: 70
End: 2025-02-20
Attending: STUDENT IN AN ORGANIZED HEALTH CARE EDUCATION/TRAINING PROGRAM
Payer: MEDICARE

## 2025-02-20 VITALS
WEIGHT: 270 LBS | SYSTOLIC BLOOD PRESSURE: 122 MMHG | RESPIRATION RATE: 20 BRPM | BODY MASS INDEX: 34.67 KG/M2 | HEART RATE: 88 BPM | DIASTOLIC BLOOD PRESSURE: 64 MMHG

## 2025-02-20 DIAGNOSIS — I20.9 TYPICAL ANGINA: ICD-10-CM

## 2025-02-20 RX ORDER — AMLODIPINE BESYLATE 5 MG/1
2.5 TABLET ORAL DAILY
Qty: 90 TABLET | Refills: 3 | Status: SHIPPED | OUTPATIENT
Start: 2025-02-20

## 2025-02-20 NOTE — LETTER
2/20/2025    [facility]  [address]  [facility] [address]    RE: Brad Acuna       Dear Colleague,     I had the pleasure of seeing Brad Acuna in the [facility].  Cardiology Clinic    Brad Acuna is a 68 year old with a history of type 2 diabetes and bipolar disorder who presents for evaluation of chest pain.    His last visit, he has done well.  He denies any chest pain, palpitations, or syncope.  He has been feeling somewhat lightheaded at home occasionally.  This happens most days and last for 20 minutes or so.  He does not check his blood pressure at home.  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    General: Well appearing, appears stated age.  CV: Normal rate and rhythm. No m/r/g. No JVD.   Pulm: Normal effort. Normal breath sounds.  Lower extremities: No lower extremity edema.    Labs:   Reviewed in epic.  A1c 9.5%  Hemoglobin 15.1  Previous 329  LDL 92    Testing:  EKG 9/19/2024: Normal sinus rhythm, left axis deviation, inferior infarct, poor R wave progression in the anterior leads  Angiogram 10/2024: Nonobstructive coronary artery disease, normal left heart filling pressures (mitral rotation)  TTE 10/2024: EF 55 to 60%, mild concentric LVH.  No significant valvular abnormalities (my interpretation)    Assessment and Plan:    1.  Typical angina, resolved.  Normal coronary arteries, no symptoms right now.    2.  Hypertension, uncontrolled.  Decrease amlodipine to 2.5 mg daily due to dizziness.    3.  Hyperlipidemia, elevated ASCVD risk.  Continue atorvastatin.    The longitudinal plan of care for the diagnosis(es)/condition(s) as documented were addressed during this visit. Due to the added complexity in care, I will continue to support Brad in the subsequent management and with ongoing continuity of care.    Return in about 1 year (around 2/20/2026).    Behzad Pichardo MD  Interventional Cardiology      Thank you for allowing me to participate in the care  of your patient.      Sincerely,     Behzad Pichardo MD     [facility]  cc:   Behzad Pichardo MD  [address] [place] [id number]

## 2025-02-20 NOTE — PROGRESS NOTES
Cardiology Clinic    Brad Acuna is a 68 year old with a history of type 2 diabetes and bipolar disorder who presents for evaluation of chest pain.    His last visit, he has done well.  He denies any chest pain, palpitations, or syncope.  He has been feeling somewhat lightheaded at home occasionally.  This happens most days and last for 20 minutes or so.  He does not check his blood pressure at home.  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    General: Well appearing, appears stated age.  CV: Normal rate and rhythm. No m/r/g. No JVD.   Pulm: Normal effort. Normal breath sounds.  Lower extremities: No lower extremity edema.    Labs:   Reviewed in epic.  A1c 9.5%  Hemoglobin 15.1  Previous 329  LDL 92    Testing:  EKG 9/19/2024: Normal sinus rhythm, left axis deviation, inferior infarct, poor R wave progression in the anterior leads  Angiogram 10/2024: Nonobstructive coronary artery disease, normal left heart filling pressures (mitral rotation)  TTE 10/2024: EF 55 to 60%, mild concentric LVH.  No significant valvular abnormalities (my interpretation)    Assessment and Plan:    1.  Typical angina, resolved.  Normal coronary arteries, no symptoms right now.    2.  Hypertension, uncontrolled.  Decrease amlodipine to 2.5 mg daily due to dizziness.    3.  Hyperlipidemia, elevated ASCVD risk.  Continue atorvastatin.    The longitudinal plan of care for the diagnosis(es)/condition(s) as documented were addressed during this visit. Due to the added complexity in care, I will continue to support Brad in the subsequent management and with ongoing continuity of care.    Return in about 1 year (around 2/20/2026).    Behzad Pichardo MD  Interventional Cardiology

## (undated) DEVICE — SYR ANGIOGRAPHY MULTIUSE KIT ACIST 014612

## (undated) DEVICE — CATH 6FR X 100CM, OPTITORQUE C

## (undated) DEVICE — CUSTOM PACK CORONARY SAN5BCRHEA

## (undated) DEVICE — ELECTRODE DEFIB CADENCE 22550R

## (undated) DEVICE — MANIFOLD KIT ANGIO AUTOMATED 014613

## (undated) DEVICE — SLEEVE TR BAND RADIAL COMPRESSION DEVICE 29CM XX-RF06L

## (undated) DEVICE — SHTH INTRO 0.021IN ID 6FR DIA

## (undated) RX ORDER — FENTANYL CITRATE 50 UG/ML
INJECTION, SOLUTION INTRAMUSCULAR; INTRAVENOUS
Status: DISPENSED
Start: 2024-10-02

## (undated) RX ORDER — DIAZEPAM 5 MG/1
TABLET ORAL
Status: DISPENSED
Start: 2024-10-02